# Patient Record
Sex: MALE | Race: WHITE | NOT HISPANIC OR LATINO | Employment: OTHER | URBAN - NONMETROPOLITAN AREA
[De-identification: names, ages, dates, MRNs, and addresses within clinical notes are randomized per-mention and may not be internally consistent; named-entity substitution may affect disease eponyms.]

---

## 2022-07-05 ENCOUNTER — OFFICE VISIT (OUTPATIENT)
Dept: FAMILY MEDICINE | Facility: OTHER | Age: 67
End: 2022-07-05
Attending: PHYSICIAN ASSISTANT
Payer: COMMERCIAL

## 2022-07-05 VITALS
WEIGHT: 200.7 LBS | TEMPERATURE: 97.8 F | RESPIRATION RATE: 16 BRPM | HEART RATE: 70 BPM | DIASTOLIC BLOOD PRESSURE: 80 MMHG | SYSTOLIC BLOOD PRESSURE: 124 MMHG | OXYGEN SATURATION: 98 %

## 2022-07-05 DIAGNOSIS — M54.16 LUMBAR RADICULOPATHY: Primary | ICD-10-CM

## 2022-07-05 PROCEDURE — 99213 OFFICE O/P EST LOW 20 MIN: CPT | Performed by: FAMILY MEDICINE

## 2022-07-05 PROCEDURE — G0463 HOSPITAL OUTPT CLINIC VISIT: HCPCS | Performed by: FAMILY MEDICINE

## 2022-07-05 RX ORDER — ATORVASTATIN CALCIUM 20 MG/1
20 TABLET, FILM COATED ORAL
COMMUNITY
Start: 2022-05-16 | End: 2023-05-21

## 2022-07-05 RX ORDER — LISINOPRIL 10 MG/1
TABLET ORAL
COMMUNITY
Start: 2022-05-16

## 2022-07-05 RX ORDER — METHYLPREDNISOLONE 4 MG
TABLET, DOSE PACK ORAL
Qty: 21 TABLET | Refills: 0 | Status: SHIPPED | OUTPATIENT
Start: 2022-07-05

## 2022-07-05 RX ORDER — DUTASTERIDE 0.5 MG/1
0.5 CAPSULE, LIQUID FILLED ORAL
COMMUNITY
Start: 2022-02-28

## 2022-07-05 ASSESSMENT — PAIN SCALES - GENERAL: PAINLEVEL: EXTREME PAIN (8)

## 2022-07-05 NOTE — NURSING NOTE
Chief Complaint   Patient presents with     Musculoskeletal Problem     Left leg   Patient presents to the clinic today for left leg pain. Rated at an 8 at bedtime. Patent states that left leg and knee has been aching and painful started about 2 weeks ago.     Medication Reconciliation: completed   Carley Friend LPN  7/5/2022 2:05 PM

## 2022-07-05 NOTE — PATIENT INSTRUCTIONS
Take prescribed medication as directed.      Follow up with Sports Medicine or Orthopedic Doctor.

## 2022-07-05 NOTE — PROGRESS NOTES
(M54.16) Lumbar radiculopathy  (primary encounter diagnosis)  Comment:   Suspicious of involvement of an upper lumbar level.  Plan: methylPREDNISolone (MEDROL DOSEPAK) 4 MG tablet        therapy pack        We discussed follow-up.  If not improving I suggested he return to clinic for reevaluation.  Could consider MRI and additional measures.  He voices understanding of recommendations.      HISTORY    Sarath is a 66-year-old man who for about 2 weeks has been experiencing pain in the left lower back which radiates down across the lateral aspect of thigh and into the anterior aspect of lower thigh.  He has some accompanying numbness.    Patient has a history of a previous surgery for disc protrusion at L5-S1 on the right side.    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      REVIEW OF SYSTEMS    No fever or chills.  No abdominal pain.  No bowel or bladder dysfunction.      EXAM  /80 (BP Location: Right arm, Patient Position: Sitting, Cuff Size: Adult Large)   Pulse 70   Temp 97.8  F (36.6  C) (Tympanic)   Resp 16   Wt 91 kg (200 lb 11.2 oz)   SpO2 98%     Patient is a thin, well-appearing man.  Spinal flexibility WNL.  He can heel and toe walk normally.  SLRs are negative.

## 2022-10-03 ENCOUNTER — HEALTH MAINTENANCE LETTER (OUTPATIENT)
Age: 67
End: 2022-10-03

## 2023-10-22 ENCOUNTER — HEALTH MAINTENANCE LETTER (OUTPATIENT)
Age: 68
End: 2023-10-22

## 2024-07-28 ENCOUNTER — HEALTH MAINTENANCE LETTER (OUTPATIENT)
Age: 69
End: 2024-07-28